# Patient Record
Sex: FEMALE | URBAN - METROPOLITAN AREA
[De-identification: names, ages, dates, MRNs, and addresses within clinical notes are randomized per-mention and may not be internally consistent; named-entity substitution may affect disease eponyms.]

---

## 2019-03-26 NOTE — PATIENT DISCUSSION
(H35.816) Drusen (degenerative) of macula, bilateral - Assesment : Examination revealed rare Drusen. - Plan : Monitor for macular changes. Eat healthy and wear sunglasses. Advised patient to call our office with decreased vision or increased symptoms. RTC in 1 year for Exam, sooner if problems or changes.

## 2019-03-26 NOTE — PATIENT DISCUSSION
(A05.141) Vitreous degeneration, bilateral - Assesment : Examination revealed PVD - longstanding. No changes reported. No holes or tears noted. - Plan : Monitor for changes. Advised patient to call our office with decreased vision or any increase in flashes and/or floaters.

## 2019-09-12 ENCOUNTER — IMPORTED ENCOUNTER (OUTPATIENT)
Dept: URBAN - METROPOLITAN AREA CLINIC 50 | Facility: CLINIC | Age: 74
End: 2019-09-12